# Patient Record
Sex: MALE | Race: WHITE | NOT HISPANIC OR LATINO | Employment: FULL TIME | URBAN - METROPOLITAN AREA
[De-identification: names, ages, dates, MRNs, and addresses within clinical notes are randomized per-mention and may not be internally consistent; named-entity substitution may affect disease eponyms.]

---

## 2018-04-30 ENCOUNTER — APPOINTMENT (OUTPATIENT)
Dept: OCCUPATIONAL MEDICINE | Facility: CLINIC | Age: 48
End: 2018-04-30

## 2018-04-30 PROCEDURE — 99000 SPECIMEN HANDLING OFFICE-LAB: CPT | Performed by: PHYSICIAN ASSISTANT

## 2024-03-11 ENCOUNTER — TRANSCRIBE ORDERS (OUTPATIENT)
Dept: OTHER | Age: 54
End: 2024-03-11

## 2024-03-11 DIAGNOSIS — M54.14 THORACIC RADICULOPATHY: Primary | ICD-10-CM

## 2024-03-18 NOTE — TELEPHONE ENCOUNTER
**3 lumbar surgeries in [place], [place] at both [facility] and [facility] - will arrive early to appointment to sign ROIs for records**        RECORDS RECEIVED FROM: External   REFERRED BY: Dean Joshi PA-C    REASON FOR VISIT: Thoracic radiculopathy   Date of Appt: 3/20/2024   NOTES (FOR ALL VISITS) STATUS DETAILS   OFFICE NOTE from referring provider Care Everywhere 3/8/2024   OFFICE NOTE from other specialist N/A    DISCHARGE SUMMARY from hospital N/A    DISCHARGE REPORT from the ER Care Everywhere 10/23/2023   SURGERY In process    PHYSICAL THERAPY N/A    INJECTIONS N/A    EMG N/A    IMAGING  (FOR ALL VISITS)     XR (HEAD, NECK, SPINE) N/A    MRI (HEAD, NECK, SPINE) Received Thoracic 3/11/24  Thoracic 3/7/24  Cervical 2/2/24   CT (HEAD, NECK, SPINE) N/A

## 2024-03-20 ENCOUNTER — OFFICE VISIT (OUTPATIENT)
Dept: NEUROSURGERY | Facility: CLINIC | Age: 54
End: 2024-03-20
Payer: COMMERCIAL

## 2024-03-20 ENCOUNTER — PRE VISIT (OUTPATIENT)
Dept: NEUROSURGERY | Facility: CLINIC | Age: 54
End: 2024-03-20

## 2024-03-20 VITALS
HEART RATE: 68 BPM | WEIGHT: 218 LBS | DIASTOLIC BLOOD PRESSURE: 88 MMHG | OXYGEN SATURATION: 97 % | SYSTOLIC BLOOD PRESSURE: 130 MMHG | HEIGHT: 69 IN | BODY MASS INDEX: 32.29 KG/M2

## 2024-03-20 DIAGNOSIS — M54.14 THORACIC RADICULOPATHY: ICD-10-CM

## 2024-03-20 PROCEDURE — 99203 OFFICE O/P NEW LOW 30 MIN: CPT | Performed by: SURGERY

## 2024-03-20 RX ORDER — GABAPENTIN 100 MG/1
100 CAPSULE ORAL AT BEDTIME
COMMUNITY
End: 2024-05-28

## 2024-03-20 ASSESSMENT — PAIN SCALES - GENERAL: PAINLEVEL: SEVERE PAIN (6)

## 2024-03-20 NOTE — LETTER
3/20/2024         RE: Paul Kay  [address] [facility] [address]        Dear Colleague,    Thank you for referring your patient, Paul Kay, to the [facility]. Please see a copy of my visit note below.    The patient is a 53-year-old male.  He complains of mid thoracic spine pain.  It is in the midline.  He feels it would get better if he could stretch it.  It is better when lying on the floor.  The pain has been present for 3 to 4 years.  It was spontaneous in onset.  No history of injury.  He has some left leg pain.  He does not have right leg pain.  He has some weakness in the left leg.  He does not have numbness or tingling in his legs.  He does not have trouble with bladder control or bowel control.  He has had multiple laminectomies at the L4-5 level.  No neck surgery.  On past medical history he is in good health with no heart disease, lung disease, cancer, or diabetes.  On exam he is well-developed and well-nourished.  I do not see or feel any deformity of his mid thoracic spine.  There is no particular tenderness.  He has good range of motion.  His reflexes are not hyperactive.  He does not have clonus.  His left ankle reflex is decreased compared to the right.  His right Babinski toe sign is downgoing.   His left is flat.  He has good strength and sensation in his legs and feet.  No atrophy.  On thoracic MRI he has a lesion posterior to his spinal cord causing flattening of his cord from behind.  He also has intrinsic damage inside the spinal cord itself.  Think he probably needs a decompression from behind with removal of the pathology compressing his posterior spinal cord.  I would like to present him at spine conference to see what people think.  I did show him the pictures.  He understands the dilemma.  Total time 30 minutes, more than 50% spent counseling and/or coordinating care.      Again, thank you for allowing me to participate in the care of your  patient.        Sincerely,        Mal Grant MD

## 2024-03-20 NOTE — PROGRESS NOTES
The patient is a 53-year-old male.  He complains of mid thoracic spine pain.  It is in the midline.  He feels it would get better if he could stretch it.  It is better when lying on the floor.  The pain has been present for 3 to 4 years.  It was spontaneous in onset.  No history of injury.  He has some left leg pain.  He does not have right leg pain.  He has some weakness in the left leg.  He does not have numbness or tingling in his legs.  He does not have trouble with bladder control or bowel control.  He has had multiple laminectomies at the L4-5 level.  No neck surgery.  On past medical history he is in good health with no heart disease, lung disease, cancer, or diabetes.  On exam he is well-developed and well-nourished.  I do not see or feel any deformity of his mid thoracic spine.  There is no particular tenderness.  He has good range of motion.  His reflexes are not hyperactive.  He does not have clonus.  His left ankle reflex is decreased compared to the right.  His right Babinski toe sign is downgoing.   His left is flat.  He has good strength and sensation in his legs and feet.  No atrophy.  On thoracic MRI he has a lesion posterior to his spinal cord causing flattening of his cord from behind.  He also has intrinsic damage inside the spinal cord itself.  Think he probably needs a decompression from behind with removal of the pathology compressing his posterior spinal cord.  I would like to present him at spine conference to see what people think.  I did show him the pictures.  He understands the dilemma.  Total time 30 minutes, more than 50% spent counseling and/or coordinating care.

## 2024-03-20 NOTE — NURSING NOTE
"Paul Kay is a 53 year old male who presents for:  Chief Complaint   Patient presents with    Consult     Middle back pain  Left leg sciatica, hip to mid calf  History of 3 laminectomies lumbar in [place], [place] 1999/2000, 2004, 2007  Doing at home PT exercises   Upcoming Botox injections for neck        Initial Vitals:  /88   Pulse 68   Ht 5' 9\" (1.753 m)   Wt 218 lb (98.9 kg)   SpO2 97%   BMI 32.19 kg/m   Estimated body mass index is 32.19 kg/m  as calculated from the following:    Height as of this encounter: 5' 9\" (1.753 m).    Weight as of this encounter: 218 lb (98.9 kg).. Body surface area is 2.19 meters squared. BP completed using cuff size: regular  Severe Pain (6)        Oswestry Disability Index (CESAR   Rian Farah 1980, All rights reserved. Used with permission)    Section 1 - Pain intensity: The pain is very severe at the moment.  Section 2 - Personal care (washing, dressing, etc.) : I can look after myself normally without causing additional pain.  Section 3 - Lifting: Pain prevents me from lifting heavy weights off the floor but I can manage if they are conveniently positioned, e.g. on a table.  Section 4 - Walking: Pain prevents me from walking more than a quarter of a mile.  Section 5 - Sitting: Pain prevents me from sitting for more than 1 hour.  Section 6 - Standing: Pain prevents me from standing for more than 1 hour.  Section 7 - Sleeping: Because of pain I have less than 4 hours sleep.  Section 8 - Sex life (if applicable): My sex life is severely restricted by pain.  Section 9 - Social life: Pain has no significant effect on my social life apart from limiting my more energetic interests, e.g. sport, etc.  Section 10 - Traveling: Pain is bad but I am able to manage trips over two hours.  Sum: 22  Count: 10  Oswestry Score (%): 44 %         Marco Vasquez  "

## 2024-03-27 ENCOUNTER — TELEPHONE (OUTPATIENT)
Dept: NEUROSURGERY | Facility: CLINIC | Age: 54
End: 2024-03-27
Payer: COMMERCIAL

## 2024-03-27 NOTE — TELEPHONE ENCOUNTER
[facility] Call Center    Phone Message    May a detailed message be left on voicemail: yes     Reason for Call: Other: Patient calling stating Dr. Grant was going to present his case at a conference.  He was told to call back and see what the next step is.  Please call him back to advise.      Action Taken: Message routed to:  Other: [facility] Neurosurgery    Travel Screening: Not Applicable

## 2024-03-27 NOTE — TELEPHONE ENCOUNTER
Called Paul with POC after spine board discussion on 03/26/2024. He said that Dr. Grant already called and him and discussed the next step of his treatment plan.   Pat Alas, RN, CNRN

## 2024-03-27 NOTE — PROGRESS NOTES
The patient was presented at brain tumor conference.  The recommendation was myelogram CT next and then decide about surgical approach.

## 2024-04-05 ENCOUNTER — TELEPHONE (OUTPATIENT)
Dept: NEUROSURGERY | Facility: CLINIC | Age: 54
End: 2024-04-05
Payer: COMMERCIAL

## 2024-04-05 DIAGNOSIS — M54.14 THORACIC RADICULOPATHY: Primary | ICD-10-CM

## 2024-04-05 NOTE — TELEPHONE ENCOUNTER
[facility] Call Center    Phone Message    May a detailed message be left on voicemail: yes     Reason for Call: Pt is requesting a call back to discuss options for treating pain.  He wonders if a cortisone injection might help?  Please call him back to discuss.  Thanks.

## 2024-04-05 NOTE — TELEPHONE ENCOUNTER
Called Paul who inquired about referral to Spine clinic for conservative measures.  Pat Alas, RN, CNRN

## 2024-04-07 ENCOUNTER — HEALTH MAINTENANCE LETTER (OUTPATIENT)
Age: 54
End: 2024-04-07

## 2024-04-22 ENCOUNTER — OFFICE VISIT (OUTPATIENT)
Dept: PALLIATIVE MEDICINE | Facility: CLINIC | Age: 54
End: 2024-04-22
Attending: SURGERY
Payer: COMMERCIAL

## 2024-04-22 VITALS — SYSTOLIC BLOOD PRESSURE: 127 MMHG | HEART RATE: 73 BPM | DIASTOLIC BLOOD PRESSURE: 82 MMHG

## 2024-04-22 DIAGNOSIS — M54.9 INTRACTABLE BACK PAIN: ICD-10-CM

## 2024-04-22 DIAGNOSIS — M54.14 THORACIC RADICULOPATHY: Primary | ICD-10-CM

## 2024-04-22 DIAGNOSIS — M25.78 DISC-OSTEOPHYTE COMPLEX: ICD-10-CM

## 2024-04-22 DIAGNOSIS — M50.30 DISC-OSTEOPHYTE COMPLEX: ICD-10-CM

## 2024-04-22 DIAGNOSIS — M51.14 INTERVERTEBRAL DISC DISORDER WITH RADICULOPATHY OF THORACIC REGION: ICD-10-CM

## 2024-04-22 PROCEDURE — 99205 OFFICE O/P NEW HI 60 MIN: CPT | Performed by: NURSE PRACTITIONER

## 2024-04-22 PROCEDURE — G2211 COMPLEX E/M VISIT ADD ON: HCPCS | Performed by: NURSE PRACTITIONER

## 2024-04-22 RX ORDER — GABAPENTIN 100 MG/1
CAPSULE ORAL
Qty: 270 CAPSULE | Refills: 0 | Status: SHIPPED | OUTPATIENT
Start: 2024-04-22

## 2024-04-22 RX ORDER — METHYLPREDNISOLONE 4 MG
TABLET, DOSE PACK ORAL
Qty: 21 TABLET | Refills: 0 | Status: SHIPPED | OUTPATIENT
Start: 2024-04-22 | End: 2024-05-13

## 2024-04-22 ASSESSMENT — PAIN SCALES - GENERAL: PAINLEVEL: SEVERE PAIN (6)

## 2024-04-22 NOTE — PROGRESS NOTES
"[facility] Medical Spine Evaluation      Date of Visit: 4/22/2024    Patient seen at the request of Mal Grant for an opinion and evaluation of thoracic radiculopathy.      Subjective    HISTORY OF PRESENT ILLNESS:  Paul Kay is a 53 year old male who presents with a chief complaint of severe radicular thoracic pain.       Back Pain  Onset: Pain has been persistent for several months with increased intensity and is not associated with trauma. The pain is localized to the mid-thoracic region bilaterally; he denies numbness/tingling; described as sharp in nature. Pain is reported as 5-6/10 at rest today and up to 8/10 at worst in the last week.    Description:   Location of pain: middle of back bilateral; also has muscle spasms along anterior lower ribcage   Radiation: None   Character of pain: Sharp and \"like I'm being stabbed in the back\"    Pain free between episodes: No, constant but varying degrees    Any injury? ( trauma, lifting, bending, twisting?): No   Work Injury (Work Comp?): No  Intensity: severe     History:  Able to sleep?:  Pain interrupts sleep     Able to work?: Yes, returned to work one week ago and pain has been \"terrible\"           History of back problems before: recurrent self limited episodes of low back pain in the past, previous degenerative joint disease of the lumbar spine, previous herniated disc, and previous spinal surgery - 3 separate laminectomies of L4-L5; most recent was 2002 in [place], [place]          Pain-free between episodes: No   Any previous evaluations for back pain: Chiropractor, Spine Specialist, MRI, and Physical Therapy    Any previous spine MRI or X-rays: Yes, Thoracic MRI     Any surgery: Yes, L4-:L5     Any cancer history: No   Accompanying Signs & Symptoms:   Fever: No   Numbness or tingling in arms, legs, feet: Yes, occasional left leg tingling     Weakness in arms, legs, feet: No   Dysuria: No    Blood in urine: No   Urinary incontinence: No "   Bowel incontinence: No   Weight loss: No   Precipitating and/or Alleviating factors:    Does rest help: Yes, lays flat on floor     Certain positions make it better or worse: Yes, unable to define what makes worse    Does bending over, or twisting make it worse: Yes   Progression of Symptoms since onset: worse  Therapies tried and outcome: physical therapy, rest, stretching, cold therapy, heat therapy, and NSAIDS with intermittent relief              Current Pain Medications:   - Ibuprofen 200mg - reports taking 20 tablets (4000mg) daily   - Gabapentin 100mg - was prescribed 200mg at bedtime     Prior/Trialed Pain Medications:    - Cyclobenzaprine (NH)  - Methocarbamol (NH)     Prior Related Surgery: multiple L4-L5 laminectomies; records not available       Specialists Seen - (with most recent, available notes and clinic visits reviewed)   1. Neurosurgery - Dr. Mal Grant        IMAGING - reviewed     MR Thoracic Spine w.wo contrast  3/11/24  Findings:   The thoracic vertebral body heights are grossly maintained.   There is grossly preserved normal thoracic kyphosis. There is no significant spondylolisthesis.   There is grossly preserved marrow signal intensity on T1 and T2 sequences. There is no evidence of significant STIR hyperintensity to suggest edema.   There is again seen focal T2/STIR hyperintense lesion within the mid spinal cord centered at the T6-T7 level extending inferiorly to the T7-T8 level. There is moderate ventral effacement of the spinal cord at the T6-T7 and T7 levels. There is no evidence of abnormal contrast enhancement within the previously seen intramedullary lesion.     Extensive degenerative disc height loss and disc protrusions are appreciated at the T4-5, T5-6, T6-T7 and T7-T8 levels.   The spinal canal is otherwise patent.   There is no significant neuroforaminal narrowing.   The paraspinous tissues are grossly unremarkable.   There is no abnormal contrast enhancement.      Impression:   Overall, stable appearance of previously seen intramedullary lesion within the spinal cord centered from the T6-T7 through T7-T8 levels without evidence of enhancement. Overall, findings may represent evolving edema secondary to ventral spinal cord herniation versus effacement of the posterior spinal cord due to an arachnoid cyst, less likely intramedullary neoplasm.       MR Thoracic Spine w.o contrast  3/07/24  Findings:   Preserved thoracic kyphosis. No significant spondylolisthesis. No acute osseous abnormality. Unremarkable bone marrow signal.     Multilevel degenerative disc changes, with prominent central disc protrusions and/or posterior disc bulges greatest at the midthoracic levels.     There is ill-defined short segment intramedullary T2 hyperintense fullness of the central T7 cord, with contiguous extension cranially within the right hemicord slightly above the T6-7 disc space. At the T7-8 level, posterior disc-osteophyte complex effaces the ventral thecal sac, and flattens the ventral cord surface. The dorsal cord surface also appears slightly flattened from the mid T7 level to the T7-8 disc space.     Remainder of the visualized spinal cord appears normal in course, caliber, and intrinsic signal. No high-grade neural foraminal or spinal canal stenosis. No suspicious findings identified in the paraspinal soft tissues.     Impression:   1. T2 hyperintense intramedullary signal abnormality and mild fullness of the T7 cord, with ventral thecal sac effacement from posterior disc-osteophyte complex at T7-8, and apparent dorsal cord surface flattening at the inferior T7 level.   2. These changes appear greater than would be expected for the degree of spondylosis.   3. Advise obtaining postcontrast MR images through this level to evaluate for underlying cord lesion, followed by thoracic myelogram to evaluate for a dorsal arachnoid web/cyst versus less likely ventral cord herniation.          REVIEW OF SYSTEMS:   I am responding to those symptoms which are directly relevant to the specific indication for my consultation. I recommend that the patient follow up with their primary or referring provider to pursue any other symptoms which may be of concern.       Medical History:  He  has no past medical history on file.     He  has no past surgical history on file.    Family History  His family history is not on file.     Social History:  Work: seasonal    History of any legal related pain issues: none          Current Medications:   He has a current medication list which includes the following prescription(s): gabapentin.     Allergies:   Allergies   Allergen Reactions    Codeine Nausea         Objective   OBJECTIVE    Vitals:    04/22/24 1416   BP: 127/82   Pulse: 73         Appearance:   A&O. Patient is appropriate. Patient is in NAD.      HHENT:  Normocephalic, atraumatic. Eyes without conjunctival injection or jaundice. Neck supple.     Pulmonary:  Normal effort; no cough or audible wheeze.       Skin: No obvious rash, lesions, or petechiae of exposed skin.    Psych:  Alert, without lethargy or stupor. Speech fluent. Appropriate affect. Mood normal. Able to follow commands without difficulty. Normal mood, judgement and behavior     Spine Musculoskeletal Exam    Inspection    Kyphosis: thoracic kyphosis    Thoracolumbar        Prior incision: none    Palpation    Thoracolumbar    Tenderness: present (thoracic paraspinal and midscapular)    Right      Spasms: mild    Left      Spasms: mild    Range of Motion    Thoracolumbar       Flexion: 50%. Flexion detail: pain.     Extension: 50%. Extension detail: pain.       Right      Lateral rotation: 50%. Lateral rotation detail: pain.       Left      Lateral rotation: 50%. Lateral rotation detail: pain.      Strength    Cervical Spine    Cervical spine motor exam is normal.    Sensory    Cervical Spine    Cervical spine sensation is  normal.    Reflexes    Right        Biceps: normal    Left        Biceps: normal             Assessment & Plan    ASSESSMENT:    Paul Kay is a pleasant 53 year old male who presents with intractable thoracic radicular pain.  Patient works a seasonal construction job and states he is trying to avoid thoracic surgery until the end of the season.  He is taking interventional options to allow him to continue to for the next 6 to 7 months.  He is not currently established with a primary care provider within the [facility] system; I encouraged him to establish care.  He would be a candidate for the chronic pain program if he has a [place] primary care provider.  He is overusing ibuprofen on a daily basis; advised him this is more likely the cause of his anterior lower rib cage pain.  Patient educated on risks of GI bleeding and also development with overuse of NSAIDs.  He returned to work 1 week ago and his pain levels are severe.  Ordering Medrol Dosepak for acute inflammation.  Stop ibuprofen.  Okay to use acetaminophen 500 mg every 4 hours.  Order placed for TESI for interventional pain control.  Starting over gabapentin taper by 100 mg every 3 days to achieve 300 mg 3 times a day.  Advised patient we will go slow with this taper to avoid side effects.Return to clinic in 3 to 4 weeks for follow-up.  Patient verbalizes understanding and agreement with plan.      PLAN:      Medication Management:   - START taking Medrol Dose Pack - follow package instructions. Do not use Ibuprofen while taking the steroid.    - TAPER Gabapentin as follows:  Take 100mg by mouth at bedtime for 3 days, then Take 100mg by mouth in AM and 100mg at bedtime for 3 days, then Take 100mg by mouth three times per day. Okay to keep adding 100mg by mouth every 3 days to achieve 300mg three times per day. If you develop side effects, drop back to the previous dose.   - Take Acetaminophen 500mg - one tablet every 4 hours for pain; do not  exceed 4000mg in 24 hours  - If you take Ibuprofen, do not exceed 2400mg per day.      New Orders:   -  T6-T7 and T7-T8 Epidural Steroid Injection      Return to Clinic:   -  30-minute Video or In-Person Appointment with Eva Fong DNP, VALENTIN, MIL in 3-4 weeks, or sooner if needed        Ready to learn, no apparent learning barriers.  Education provided on treatment plan according to patient's preferred learning style.  Patient verbalizes understanding.       VISIT DIAGNOSIS:   (M54.14) Thoracic radiculopathy  (primary encounter diagnosis)  Plan: methylPREDNISolone (MEDROL DOSEPAK) 4 MG tablet        therapy pack, gabapentin (NEURONTIN) 100 MG         capsule, Adult Pain Clinic Follow-Up Order,         PAIN INJECTION EVAL/TREAT/FOLLOW UP          (M50.30,  M25.78) Disc-osteophyte complex  Plan: methylPREDNISolone (MEDROL DOSEPAK) 4 MG tablet        therapy pack, gabapentin (NEURONTIN) 100 MG         capsule, Adult Pain Clinic Follow-Up Order,         PAIN INJECTION EVAL/TREAT/FOLLOW UP          (M54.9) Intractable back pain  Plan: methylPREDNISolone (MEDROL DOSEPAK) 4 MG tablet        therapy pack, gabapentin (NEURONTIN) 100 MG         capsule, Adult Pain Clinic Follow-Up Order          (M51.14) Intervertebral disc disorder with radiculopathy of thoracic region  Plan: PAIN INJECTION EVAL/TREAT/FOLLOW UP              ___________________________________________________________________    BILLING TIME DOCUMENTATION:   TOTAL TIME includes:   Time spent preparing to see the patient: 10 minutes (reviewing records and tests)  Time spend face to face with the patient: 45 minutes  Time spent ordering tests, medications, procedures and referrals: 0 minutes  Time spent Referring and communicating with other healthcare professionals: 0 minutes  Documenting clinical information in Epic: 5 minutes    The total TIME spent on this patient on the day of the appointment was 60 minutes.    ___________________________________________________________________    Review of Electronic Chart, Relevant Records/History: Today I have also reviewed available medical information in the patient's medical record at [facility] and Care Everywhere (if available), including relevant provider notes, laboratory work, and imaging. Please see the [facility] health questionnaire which the patient completed and reviewed with me in detail.     Eva Fong, APRN, DNP, FNP-C   [facility]

## 2024-04-22 NOTE — PATIENT INSTRUCTIONS
PATIENT INSTRUCTIONS:     I am recommending a multidisciplinary treatment plan to help this patient better manage his pain. The following recommendations were given to the patient. Diagnosis, treatment options, risks, benefits, and alternatives were discussed; all questions were answered. Self-care instructions were given. The patient expressed understanding of the plan for management.   Medication Management:   - START taking Medrol Dose Pack - follow package instructions. Do not use Ibuprofen while taking the steroid.    - TAPER Gabapentin as follows:  Take 100mg by mouth at bedtime for 3 days, then Take 100mg by mouth in AM and 100mg at bedtime for 3 days, then Take 100mg by mouth three times per day. Okay to keep adding 100mg by mouth every 3 days to achieve 300mg three times per day. If you develop side effects, drop back to the previous dose.   - Take Acetaminophen 500mg - one tablet every 4 hours for pain; do not exceed 4000mg in 24 hours  - If you take Ibuprofen, do not exceed 2400mg per day.   New Orders:   -  T6-T7 and T7-T8 Epidural Steroid Injection    Return to Clinic:   -  30-minute Video or In-Person Appointment with Eva Fong, TYLER, APRN, ERMAC in 3-4 weeks, or sooner if needed    ----------------------------------------------------------------  Clinic Number:  [phone]   Call with any questions about your care and for scheduling assistance.   Calls are returned Monday through Friday between 8 AM and 4:30 PM. We usually get back to you within 2 business days depending on the issue/request.    If we are prescribing your medications:  For opioid medication refills, call the clinic or send a Memeo message 7 days in advance.  Please include:  Name of requested medication  Name of the pharmacy.  For non-opioid medications, call your pharmacy directly to request a refill. Please allow 3-4 days to be processed.   Per [place] State Law:  All controlled substance prescriptions must be filled within 30  days of being written.    For those controlled substances allowing refills, pickup must occur within 30 days of last fill.      We believe regular attendance is key to your success in our program!    Any time you are unable to keep your appointment we ask that you call us at least 24 hours in advance to cancel.This will allow us to offer the appointment time to another patient.   Multiple missed appointments may lead to dismissal from the clinic.

## 2024-04-23 ENCOUNTER — TELEPHONE (OUTPATIENT)
Dept: PALLIATIVE MEDICINE | Facility: CLINIC | Age: 54
End: 2024-04-23
Payer: COMMERCIAL

## 2024-04-23 DIAGNOSIS — M54.14 THORACIC RADICULOPATHY: Primary | ICD-10-CM

## 2024-04-23 NOTE — TELEPHONE ENCOUNTER
"Patient is taking a steroid, he will call back to let us know the end date to schedule.     Screening Questions for Radiology Injections:    Injection to be done at which interventional clinic site? [facility]    If choosing [facility] for location, please inform patient:  \"[facility] is a Hospital based clinic. Before your visit, you should check with your insurance about how it covers the charges for facility services in a hospital-based clinic.     Procedure ordered by     Procedure ordered? T6-7 and T7-8 Epidural Steroid Injection   Transforaminal Cervical ANASTACIA - Send to [facility] ([facility]) - No [facility] Site providers perform this procedure    What insurance would patient like us to bill for this procedure? [facility]   IF SCHEDULING IN [place] [facility] OR SPINE PLEASE SCHEDULE AT LEAST 7-10 BUSINESS DAYS OUT SO A PA CAN BE OBTAINED  Worker's comp or MVA (motor vehicle accident) -Any injection DO NOT SCHEDULE and route to Tana Sosa.    [id number] insurance - For SI joint injections, DO NOT SCHEDULE and route to Francia Corcoran.     ALL BCBS, [employer] and [employer] - DO NOT SCHEDULE and route to Francia Corcoran  MEDICA- ALL INJECTIONS- route to Francia Corcoran    Is patient scheduled at [facility]?    If YES, route every encounter to [facility] SPINE CENTER CARE NAVIGATION POOL [[phone]]    Is an  needed? No     Patient has a  home? (Review Grid) YES: informed     Any chance of pregnancy? Not Applicable   If YES, do NOT schedule and route to RN pool  - Dr. Dey route to Cely Canales and PM&R Nurse  [[id number]]      Is patient actively being treated for cancer or immunocompromised? No  If YES, do NOT schedule and route to RN pool/ Dr. Dey's Team    Does the patient have a bleeding or clotting disorder? No   If YES, okay to schedule AND route to RN  / Dr. Dey's Team   (For any patients with platelet count <100, RN must forward to provider)    Is patient taking any Blood " Thinners OR Antiplatelet medication?  No   If hold needed, do NOT schedule, route to RN pool/ Dr. Dey's Team  Examples:   Blood Thinners: (Coumadin, Warfarin, Jantoven, Pradaxa, Xarelto, Eliquis, Edoxaban, Enoxaparin, Lovenox, Heparin, Arixtra, Fondaparinux or Fragmin)  Antiplatelet Medications: (Plavix, Brilinta or Effient)     Is patient taking any aspirin products (includes Excedrin and Fiorinal)? No   If yes route to RN pool/ Dr. Dey's Team - Do not schedule      Any allergies to contrast dye, iodine, shellfish, or numbing and steroid medications? No  If YES, schedule and add allergy information to appointment notes AND route to the RN pool/ Dr. Dey's Team  If ANASTACIA and Contrast Dye / Iodine Allergy? DO NOT SCHEDULE, route to RN pool/ Dr. Thomass Team  Allergies: Codeine     Does patient have an active infection or treated for one within the past week? No  Is patient currently taking any antibiotics or steroid medications?  Yes - will    For patients on chronic, preventative, or prophylactic antibiotics, procedures may be scheduled.   For patients on antibiotics for active or recent infection, schedule 4 days after completed.  For patients on steroid medications, schedule 4 days after completed.     Has the patient had a flu shot or any other vaccinations within the past 7 days? No  If yes, explain that for the vaccine to work best they need to:     wait 1 week before and 1 week after getting any Vaccine  wait 1 week before and 2 weeks after getting any Covid Vaccine   If patient has concerns about the timing, send to RN pool/ Dr. Thomass Team    Does patient have an MRI/CT?  YES: 2024 Include Date and Check Procedure Scheduling Grid to see if required.  Was the MRI/CT done within the last 3 years?  Yes   If no route to RN Pool/ Dr. Thomass Team  If yes, where was the MRI/CT done? [facility]    Refer to PACS Transmissions list for approved external locations and route to RN Pool High Priority/ Dr. Thomass  Team  If MRI was not done at approved external location do NOT schedule and route to RN pool/ Dr. Dey's Team    If patient has an imaging disc, the injection MAY be scheduled but patient must bring disc to appt or appt will be cancelled.    Is patient able to transfer to a procedure table with minimal or no assistance? Yes   If no, do NOT schedule and route to RN Pool/ Dr. Dey's Team    Procedure Specific Instructions:  If celiac plexus block, informed patient NPO for 6 hours and that it is okay to take medications with sips of water, especially blood pressure medications Not Applicable       If this is for a cervical procedure, informed patient that aspirin needs to be held for 6 days.   Not Applicable    Sedation, If Sedation is ordered for any procedure, patient must be NPO for 6 hours prior to procedure Not Applicable    If IV needed:  Do not schedule procedures requiring IV placement in the first appointment of the day or first appointment after lunch. Do NOT schedule at 0745, 0815 or 1245.   Instructed patient to arrive 30 minutes early for IV start if required. (Check Procedure Scheduling Grid)  Not Applicable    Reminders:  If you are started on any steroids or antibiotics between now and your appointment, you must contact us because the procedure may need to be cancelled.  Yes    As a reminder, receiving steroids can decrease your body's ability to fight infection.   Would you still like to move forward with scheduling the injection?  Yes    IV Sedation is not provided for procedures. If oral anti-anxiety medication is needed, the patient should request this from their referring provider.    Instruct patient to arrive as directed prior to the scheduled appointment time:  If IV needed 30 minutes before appointment time     For patients 85 or older we recommend having an adult stay w/ them for the remainder of the day.     If the patient is Diabetic, remind them to bring their glucometer.      Does the  patient have any questions?  NO  Roseline Ayala  [facility] [place]

## 2024-05-13 ENCOUNTER — OFFICE VISIT (OUTPATIENT)
Dept: PALLIATIVE MEDICINE | Facility: CLINIC | Age: 54
End: 2024-05-13
Payer: COMMERCIAL

## 2024-05-13 VITALS — HEART RATE: 69 BPM | SYSTOLIC BLOOD PRESSURE: 124 MMHG | DIASTOLIC BLOOD PRESSURE: 83 MMHG

## 2024-05-13 DIAGNOSIS — M51.14 INTERVERTEBRAL DISC DISORDER WITH RADICULOPATHY OF THORACIC REGION: ICD-10-CM

## 2024-05-13 DIAGNOSIS — M54.14 THORACIC RADICULOPATHY: ICD-10-CM

## 2024-05-13 DIAGNOSIS — M25.78 DISC-OSTEOPHYTE COMPLEX: ICD-10-CM

## 2024-05-13 DIAGNOSIS — M54.9 INTRACTABLE BACK PAIN: Primary | ICD-10-CM

## 2024-05-13 DIAGNOSIS — M50.30 DISC-OSTEOPHYTE COMPLEX: ICD-10-CM

## 2024-05-13 DIAGNOSIS — Z51.81 ENCOUNTER FOR THERAPEUTIC DRUG MONITORING: ICD-10-CM

## 2024-05-13 LAB
CANNABINOIDS UR QL SCN: ABNORMAL
CREAT UR-MCNC: 147 MG/DL

## 2024-05-13 PROCEDURE — G0481 DRUG TEST DEF 8-14 CLASSES: HCPCS | Performed by: NURSE PRACTITIONER

## 2024-05-13 PROCEDURE — G2211 COMPLEX E/M VISIT ADD ON: HCPCS | Performed by: NURSE PRACTITIONER

## 2024-05-13 PROCEDURE — 99214 OFFICE O/P EST MOD 30 MIN: CPT | Performed by: NURSE PRACTITIONER

## 2024-05-13 PROCEDURE — 80307 DRUG TEST PRSMV CHEM ANLYZR: CPT | Performed by: NURSE PRACTITIONER

## 2024-05-13 RX ORDER — TRAMADOL HYDROCHLORIDE 50 MG/1
50 TABLET ORAL EVERY 6 HOURS PRN
Qty: 42 TABLET | Refills: 0 | Status: SHIPPED | OUTPATIENT
Start: 2024-05-13 | End: 2024-06-20

## 2024-05-13 RX ORDER — CYCLOBENZAPRINE HCL 5 MG
5-10 TABLET ORAL EVERY 8 HOURS PRN
Qty: 30 TABLET | Refills: 0 | Status: SHIPPED | OUTPATIENT
Start: 2024-05-13

## 2024-05-13 ASSESSMENT — PAIN SCALES - GENERAL: PAINLEVEL: SEVERE PAIN (6)

## 2024-05-13 NOTE — PATIENT INSTRUCTIONS
----------------------------------------------------------------  [facility] Number:  [phone]   Call with any questions about your care and for scheduling assistance.   Calls are returned Monday through Friday between 8 AM and 4:30 PM. We usually get back to you within 2 business days depending on the issue/request.    If we are prescribing your medications:  For opioid medication refills, call the clinic or send a NextNine message 7 days in advance.  Please include:  Name of requested medication  Name of the pharmacy.  For non-opioid medications, call your pharmacy directly to request a refill. Please allow 3-4 days to be processed.   Per [place] State Law:  All controlled substance prescriptions must be filled within 30 days of being written.    For those controlled substances allowing refills, pickup must occur within 30 days of last fill.      We believe regular attendance is key to your success in our program!    Any time you are unable to keep your appointment we ask that you call us at least 24 hours in advance to cancel.This will allow us to offer the appointment time to another patient.   Multiple missed appointments may lead to dismissal from the clinic.

## 2024-05-13 NOTE — PROGRESS NOTES
[facility] Follow-Up Encounter      Date of Visit: 5/13/2024    Recall: Paul Kay is a 53 year old male presents with thoracic radiculopathy.     Patient was last seen in clinic 4/22/24. Since last time he felt much better after using Medrol dose pack; but severe pain returned after 2-3 days. Physical activity over the past weekend was higher than normal. He has tolerated Gabapentin taper up to 300mg TID.  States pain is so severe that he is nearly immobile by the time he is on for work and this is impacting his relationships at home.  He is questioning the cause of the spinal cord lesion and does not understand the full impact of his thoracic MRI results.  He is trying to avoid surgery as long as possible.  Struggling to get to physical therapy appointments due to work schedule; does have some exercises he has been working on at home.       Interval assessment and plan from 4/22/24:   Paul Kay is a pleasant 53 year old male who presents with intractable thoracic radicular pain.  Patient works a seasonal construction job and states he is trying to avoid thoracic surgery until the end of the season.  He is taking interventional options to allow him to continue to for the next 6 to 7 months.  He is not currently established with a primary care provider within the [facility] system; I encouraged him to establish care.  He would be a candidate for the chronic pain program if he has a [place] primary care provider.  He is overusing ibuprofen on a daily basis; advised him this is more likely the cause of his anterior lower rib cage pain.  Patient educated on risks of GI bleeding and also development with overuse of NSAIDs.  He returned to work 1 week ago and his pain levels are severe.  Ordering Medrol Dosepak for acute inflammation.  Stop ibuprofen.  Okay to use acetaminophen 500 mg every 4 hours.  Order placed for TESI for interventional pain control.  Starting over gabapentin taper  by 100 mg every 3 days to achieve 300 mg 3 times a day.  Advised patient we will go slow with this taper to avoid side effects.Return to clinic in 3 to 4 weeks for follow-up.  Patient verbalizes understanding and agreement with plan.  PLAN:    Medication Management:   - START taking Medrol Dose Pack - follow package instructions. Do not use Ibuprofen while taking the steroid.    - TAPER Gabapentin as follows:  Take 100mg by mouth at bedtime for 3 days, then Take 100mg by mouth in AM and 100mg at bedtime for 3 days, then Take 100mg by mouth three times per day. Okay to keep adding 100mg by mouth every 3 days to achieve 300mg three times per day. If you develop side effects, drop back to the previous dose.   - Take Acetaminophen 500mg - one tablet every 4 hours for pain; do not exceed 4000mg in 24 hours  - If you take Ibuprofen, do not exceed 2400mg per day.   New Orders:   -  T6-T7 and T7-T8 Epidural Steroid Injection    Return to Clinic:   -  30-minute Video or In-Person Appointment with Eva Fong, TYLER, VALENTIN, MIL in 3-4 weeks, or sooner if needed           Review of Systems:   GENERAL: no recent illness, unexplained weight loss or weight gain  NEUROLOGIC: no new numbness, tingling, or weakness except as mentioned in HPI   : Continent bladder  GI: Continent bowel      Imaging: Interim updated imaging report(s) reviewed.     he  has no past medical history on file.     he  has no past surgical history on file.      he has a current medication list which includes the following prescription(s): gabapentin, gabapentin, and methylprednisolone.     his family history is not on file.     he       OBJECTIVE    Vitals:    05/13/24 1324   BP: 124/83   Pulse: 69         Appearance:   A&O. Patient is appropriate. Patient appears uncomfortable.      HHENT:  Normocephalic, atraumatic. Eyes without conjunctival injection or jaundice. Neck supple.     Pulmonary:  Normal effort; no cough or audible wheeze.       Skin: No  obvious rash, lesions, or petechiae of exposed skin.    Psych:  Alert, without lethargy or stupor. Speech fluent.  Tearful affect. Mood normal. Able to follow commands without difficulty. Normal mood, judgement and behavior         Assessment & Plan    ASSESSMENT:    Paul Kay is a pleasant 53 year old male who presents with thoracic radiculopathy and intractable back pain.  He is scheduled for thoracic ANASTACIA next week but states pain has been excruciating.  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  In the meantime he states he is desperate for any pain relief.  I offered a 14-day trial of tramadol for severe breakthrough pain.  Also adding cyclobenzaprine 5 to 10 mg at bedtime to ease pain and promote sleep.  Increase gabapentin to 400 mg 3 times daily.  Reinforced that epidural steroid injection next week should be very beneficial.  In the meantime, I am willing to bring the patient into the chronic pain program to address longer term pain needs.  Ordering urine drug screen today.  Return to clinic in 2 weeks to reassess pain level, response to thoracic ANASTACIA and establish controlled substance agreement for ongoing management.  I discussed the benefits of buprenorphine for chronic pain management and will consider a trial after prior authorization.  Patient verbalizes understanding agreement with plan.    PLAN:      Medication Management:   - START taking Cyclobenzaprine 5mg - take 1-2 tabs (5-10mg) every 6-8 hours as needed; do not exceed 3 per day    - START taking Tramadol 50mg - take 1-1.5 tabs every 6-8 hours for severe pain; do not exceed 3 per day   - INCREASE dose of Gabapentin to 400mg by mouth three times per day      Continue Current Treatment Plan with:   - Physical Therapy    - Thoracic epidural steroid injection next week.      New Referrals:   - Chiropractor:    I highly recommend Chiropractic care for conservative treatment your  condition. Please contact your health insurance customer service line to find a local Chiropractor that participates in your health plan.      New Orders:   - Urine drug screen to confirm status of control substance use.       Return to Clinic:   -  30-minute In-Person Appointment with Eva Fong DNP, VALENTIN, MIL in 2-3 weeks, or sooner if needed         Ready to learn, no apparent learning barriers.  Education provided on treatment plan according to patient's preferred learning style.  Patient verbalizes understanding.       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  Plan: Adult Pain Clinic Follow-Up Order          (Z51.81) Encounter for therapeutic drug monitoring  Plan: Drug Confirmation Panel Urine with Creatinine,         Adult Pain Clinic Follow-Up Order              ___________________________________________________________________    BILLING TIME DOCUMENTATION:   TOTAL TIME includes:   Time spent preparing to see the patient: 3 minutes (reviewing records and tests)  Time spend face to face with the patient: 30 minutes  Time spent ordering tests, medications, procedures and referrals: 0 minutes  Time spent Referring and communicating with other healthcare professionals: 0 minutes  Documenting clinical information in Epic: 5 minutes    The total TIME spent on this patient on the day of the appointment was 38 minutes.    ___________________________________________________________________    Review of Electronic Chart, Relevant Records/History: Today I have also reviewed available medical information in the patient's medical record at [facility] and Care Everywhere (if available), including relevant provider notes, laboratory work, and imaging. Please see the [facility] health questionnaire which the patient completed and reviewed with me in detail.     Eva Fong, APRN, DNP, FNP-C   [facility]

## 2024-05-13 NOTE — PROGRESS NOTES
05/13/24 1326   PEG: A Thee-Item Scale Assessing Pain Intensity and Interference        0 = No pain / No interference    10 = Pain as bad as you can imagine / Completely interferes   What number best describes your pain on average in the past week? 5   What number best describes how, during the past week, pain has interfered with your enjoyment of life? 6   What number best describes how, during the past week, pain has interfered with your general activity? 7   PEG Total Score 6

## 2024-05-15 LAB — GABAPENTIN UR QL CFM: PRESENT

## 2024-05-22 ENCOUNTER — TELEPHONE (OUTPATIENT)
Dept: PALLIATIVE MEDICINE | Facility: OTHER | Age: 54
End: 2024-05-22
Payer: COMMERCIAL

## 2024-05-22 NOTE — TELEPHONE ENCOUNTER
Preprocedure reminder call Thoracic ANASTACIA    Arrival time 0815am    Location: [facility] [address] [facility]    Do you have a ? yes    If patient doesn't have a  they will need to call and reschedule    Has the patient had a flu shot or any other vaccinations within the past 7 days? no    If yes, explain that for the vaccine to work best they need to:              wait 1 week before and 1 week after getting any Vaccine           wait 1 week before and 2 weeks after getting any Covid Vaccine    If patient has concerns about the timing, send to RN pool    Have you had any oral steroids or antibiotics in the last five days? no (Oral steroid ok per Alfaro but note the patient is taking it or not)    If yes check with the nurse or provider. The procedure will most likely need to be rescheduled.    Its ok to eat and drink as usual and take your  BP and diabetes medications if this applies to you. ok (note patient has been informed yes or no)      To call and reschedule or talk to the nurse about any questions you may have please dial    [phone]

## 2024-05-23 ENCOUNTER — RADIOLOGY INJECTION OFFICE VISIT (OUTPATIENT)
Dept: PALLIATIVE MEDICINE | Facility: OTHER | Age: 54
End: 2024-05-23
Attending: NURSE PRACTITIONER
Payer: COMMERCIAL

## 2024-05-23 VITALS — DIASTOLIC BLOOD PRESSURE: 78 MMHG | OXYGEN SATURATION: 97 % | SYSTOLIC BLOOD PRESSURE: 129 MMHG | HEART RATE: 62 BPM

## 2024-05-23 DIAGNOSIS — M51.14 INTERVERTEBRAL DISC DISORDER WITH RADICULOPATHY OF THORACIC REGION: ICD-10-CM

## 2024-05-23 DIAGNOSIS — M54.14 THORACIC RADICULOPATHY: Primary | ICD-10-CM

## 2024-05-23 DIAGNOSIS — M50.30 DISC-OSTEOPHYTE COMPLEX: ICD-10-CM

## 2024-05-23 DIAGNOSIS — M25.78 DISC-OSTEOPHYTE COMPLEX: ICD-10-CM

## 2024-05-23 PROCEDURE — 250N000011 HC RX IP 250 OP 636: Performed by: STUDENT IN AN ORGANIZED HEALTH CARE EDUCATION/TRAINING PROGRAM

## 2024-05-23 PROCEDURE — 255N000002 HC RX 255 OP 636: Performed by: STUDENT IN AN ORGANIZED HEALTH CARE EDUCATION/TRAINING PROGRAM

## 2024-05-23 PROCEDURE — 62321 NJX INTERLAMINAR CRV/THRC: CPT | Performed by: STUDENT IN AN ORGANIZED HEALTH CARE EDUCATION/TRAINING PROGRAM

## 2024-05-23 PROCEDURE — 250N000009 HC RX 250: Performed by: STUDENT IN AN ORGANIZED HEALTH CARE EDUCATION/TRAINING PROGRAM

## 2024-05-23 RX ORDER — DEXAMETHASONE SODIUM PHOSPHATE 10 MG/ML
10 INJECTION INTRAMUSCULAR; INTRAVENOUS ONCE
Status: COMPLETED | OUTPATIENT
Start: 2024-05-23 | End: 2024-05-23

## 2024-05-23 RX ORDER — LIDOCAINE HYDROCHLORIDE 10 MG/ML
1 INJECTION, SOLUTION EPIDURAL; INFILTRATION; INTRACAUDAL; PERINEURAL ONCE
Status: COMPLETED | OUTPATIENT
Start: 2024-05-23 | End: 2024-05-23

## 2024-05-23 RX ADMIN — DEXAMETHASONE SODIUM PHOSPHATE 10 MG: 10 INJECTION INTRAMUSCULAR; INTRAVENOUS at 10:00

## 2024-05-23 RX ADMIN — IOHEXOL 1.5 ML: 180 INJECTION INTRAVENOUS at 09:12

## 2024-05-23 RX ADMIN — LIDOCAINE HYDROCHLORIDE 1 ML: 10 INJECTION, SOLUTION EPIDURAL; INFILTRATION; INTRACAUDAL; PERINEURAL at 10:00

## 2024-05-23 ASSESSMENT — PAIN SCALES - GENERAL: PAINLEVEL: MILD PAIN (2)

## 2024-05-23 NOTE — PROGRESS NOTES
Pre procedure Diagnosis: thoracic radiculopathy   Post procedure Diagnosis: Same  Procedure performed: T6-7 interlaminar epidural steroid injection, CPT code 37496  Anesthesia: none  Complications: none  Operators: Piotr Alfaro MD     Indications:   Paul Kay is a 53 year old male was sent by Eva Fong NP for a thoracic epidural steroid injection. During the consent process, this procedure was determined to be an appropriate intervention for the patient's symptoms.    Options/alternatives, benefits and risks were discussed with the patient including bleeding, infection, no pain relief, tissue trauma, exposure to radiation, reaction to medications, spinal cord injury, dural puncture, weakness, numbness and headache.  Questions were answered to his satisfaction and he agrees to proceed. Voluntary informed consent was obtained and signed.     Vitals were reviewed: Yes  Allergies were reviewed:  Yes   Medications were reviewed:  Yes   Pre-procedure pain score: see flowsheet/10    Procedure:  After getting informed consent, patient was brought into the procedure suite and was placed in a prone position on the procedure table.   A Pause for the Cause was performed.  Patient was prepped and draped in sterile fashion.     The T6-7 interspace was identified with AP fluoroscopy.  A total of 2ml of 1% lidocaine was used to anesthetize the skin for a left paramedian approach.    A 22gauge 3.5inch Touhy needle was advanced out of plane in a [id number] direction under intermittent fluoroscopy.  A DARIANA syringe was used to advance the needle into the epidural space.   After loss of resistance, there was no evidence of blood or CSF on aspiration. Location was verified with both AP and either contralateral oblique or lateral fluoroscopic imaging.    A total of 1ml of Omnipaque 180 was injected demonstrating appropriate epidural spread and was checked in both the AP and either contralateral oblique or lateral  views to establish multiplanar confirmation. There was no evidence of intravascular or intrathecal spread. 9ml contrast wasted    1 ml of 1% preservative free lidocaine with 10mg of dexamethasone and 2ml of preservative free saline was injected.  The needle was removed from the epidural space, flushed with 1% lidocaine and removed.     Hemostasis was achieved, the area was cleaned, and bandaids were placed when appropriate.  The patient tolerated the procedure well, and was taken to the recovery room.    Images were saved to PACS.    Post-procedure pain score: 2/10  Follow-up includes:   -f/u with referring provider    Piotr Alfaro MD  Interventional Pain Medicine  [facility]

## 2024-05-23 NOTE — PATIENT INSTRUCTIONS
[facility]   Procedure Discharge Instructions    Today you saw:  Dr. Alfaro    You had an:  T6-7 Epidural steroid injection       Medications used:  Lidocaine Methylprednisolone Omnipaque Normal saline        If you were holding your blood thinning medication, please restart taking it: N/A  Be cautious when walking. Numbness and/or weakness in the lower extremities may occur for up to 6-8 hours after the procedure due to effect of the local anesthetic  Do not drive for 6 hours. The effect of the local anesthetic could slow your reflexes.   You may resume your regular activities after 24 hours  Avoid strenuous activity for the first 24 hours  You may shower, however avoid swimming, tub baths or hot tubs for 24 hours following your procedure  You may have a mild to moderate increase in pain for several days following the injection.  It may take up to 14 days for the steroid medication to start working although you may feel the effect as early as a few days after the procedure.     You may use ice packs for 10-15 minutes, 3 to 4 times a day at the injection site for comfort  Do not use heat to painful areas for 6 to 8 hours. This will give the local anesthetic time to wear off and prevent you from accidentally burning your skin.   Unless you have been directed to avoid the use of anti-inflammatory medications (NSAIDS), you may use medications such as ibuprofen, Aleve or Tylenol for pain control if needed.   If you were fasting, you may resume your normal diet and medications after the procedure  If you have diabetes, check your blood sugar more frequently than usual as your blood sugar may be higher than normal for 10-14 days following a steroid injection. Contact your doctor who manages your diabetes if your blood sugar is higher than usual  Possible side effects of steroids that you may experience include flushing, elevated blood pressure, increased appetite, mild headaches  and restlessness.  All of these symptoms will get better with time.  If you experience any of the following, call the Pain Clinic at [phone]:  -Fever over 100 degree F  -Swelling, bleeding, redness, drainage, warmth at the injection site  -Progressive weakness or numbness in your legs or arms  -Loss of bowel or bladder function  -Unusual headache that is not relieved by Tylenol or other pain reliever  -Unusual new onset of pain that is not improving

## 2024-05-23 NOTE — PROGRESS NOTES
Pre-procedure Intake  If YES to any questions or NO to having a   Please complete laminated checklist and leave on the computer keyboard for Provider, verbally inform provider if able.    Are you taking any any blood thinners such as Coumadin, Warfarin, Jantoven, Pradaxa Xarelto, Eliquis, Edoxaban, Enoxaparin, Lovenox, Heparin, Arixtra, Fondaparinux, or Fragmin? OR Antiplatelet medication such as Plavix, Brilinta, or Effient?   No   If yes, when did you take your last dose? na    Do you take aspirin?  No  If cervical procedure, have you held aspirin for 6 days?   NA    Do you have any allergies to contrast dye, iodine, steroid and/or numbing medications?  NO    Are you currently taking antibiotics or have an active infection?  NO    Have you had a fever/elevated temperature within the past week? NO    Are you currently taking oral steroids? NO    Have you had any vaccinations in the past seven days? NO    Do you have a ? Yes    Are you pregnant or breastfeeding?  Not Applicable    Have you received the COVID-19 vaccine? No no  If yes, was it your 1st, 2nd or only dose needed? na  Date of most recent vaccine: na    Notify provider and RNs if systolic BP >170, diastolic BP >100, P >100 or O2 sats < 90%

## 2024-05-28 ENCOUNTER — OFFICE VISIT (OUTPATIENT)
Dept: PALLIATIVE MEDICINE | Facility: CLINIC | Age: 54
End: 2024-05-28
Payer: COMMERCIAL

## 2024-05-28 VITALS — HEART RATE: 83 BPM | DIASTOLIC BLOOD PRESSURE: 100 MMHG | SYSTOLIC BLOOD PRESSURE: 146 MMHG

## 2024-05-28 DIAGNOSIS — M50.30 DISC-OSTEOPHYTE COMPLEX: ICD-10-CM

## 2024-05-28 DIAGNOSIS — Z51.81 ENCOUNTER FOR THERAPEUTIC DRUG MONITORING: ICD-10-CM

## 2024-05-28 DIAGNOSIS — M25.78 DISC-OSTEOPHYTE COMPLEX: ICD-10-CM

## 2024-05-28 DIAGNOSIS — M54.14 THORACIC RADICULOPATHY: Primary | ICD-10-CM

## 2024-05-28 DIAGNOSIS — M51.14 INTERVERTEBRAL DISC DISORDER WITH RADICULOPATHY OF THORACIC REGION: ICD-10-CM

## 2024-05-28 DIAGNOSIS — M54.9 INTRACTABLE BACK PAIN: ICD-10-CM

## 2024-05-28 PROCEDURE — 99214 OFFICE O/P EST MOD 30 MIN: CPT | Performed by: NURSE PRACTITIONER

## 2024-05-28 PROCEDURE — G2211 COMPLEX E/M VISIT ADD ON: HCPCS | Performed by: NURSE PRACTITIONER

## 2024-05-28 ASSESSMENT — PAIN SCALES - GENERAL: PAINLEVEL: MODERATE PAIN (5)

## 2024-05-28 NOTE — LETTER
Opioid / Opioid Plus Controlled Substance Agreement    This is an agreement between you and your provider about the safe and appropriate use of controlled substance/opioids prescribed by your care team. Controlled substances are medicines that can cause physical and mental dependence (abuse).    There are strict laws about having and using these medicines. We here at [facility] are committing to working with you in your efforts to get better. To support you in this work, we ll help you schedule regular office appointments for medicine refills. If we must cancel or change your appointment for any reason, we ll make sure you have enough medicine to last until your next appointment.     As a Provider, I will:  Listen carefully to your concerns and treat you with respect.   Recommend a treatment plan that I believe is in your best interest. This plan may involve therapies other than opioid pain medication.   Talk with you often about the possible benefits, and the risk of harm of any medicine that we prescribe for you.   Provide a plan on how to taper (discontinue or go off) using this medicine if the decision is made to stop its use.    As a Patient, I understand that opioid(s):   Are a controlled substance prescribed by my care team to help me function or work and manage my condition(s).   Are strong medicines and can cause serious side effects such as:  Drowsiness, which can seriously affect my driving ability  A lower breathing rate, enough to cause death  Harm to my thinking ability   Depression   Abuse of and addiction to this medicine  Need to be taken exactly as prescribed. Combining opioids with certain medicines or chemicals (such as illegal drugs, sedatives, sleeping pills, and benzodiazepines) can be dangerous or even fatal. If I stop opioids suddenly, I may have severe withdrawal symptoms.  Do not work for all types of pain nor for all patients. If they re not helpful, I may be asked to stop  them.    The risks, benefits and side effects of these medicine(s) were explained to me. I agree that:  I will take part in other treatments as advised by my care team. This may be psychiatry or counseling, physical therapy, behavioral therapy, group treatment or a referral to a specialist.     I will keep all my appointments. I understand that this is part of the monitoring of opioids. My care team may require an office visit for EVERY opioid/controlled substance refill. If I miss appointments or don t follow instructions, my care team may stop my medicine.    I will take my medicines as prescribed. I will not change the dose or schedule unless my care team tells me to. There will be no refills if I run out early.     I may be asked to come to the clinic and complete a urine drug test or complete a pill count at any time. If I don t give a urine sample or participate in a pill count, the care team may stop my medicine.    I will only receive prescriptions from this clinic for chronic pain. If I am treated by another provider for acute pain issues, I will tell them that I am taking opioid pain medication for chronic pain and that I have a treatment agreement with this provider. I will inform my [facility] care team within one business day if I am given a prescription for any pain medication by another healthcare provider. My [facility] care team can contact other providers and pharmacists about my use of any medicines.    It is up to me to make sure that I don t run out of my medicines on weekends or holidays. If my care team is willing to refill my opioid prescription without a visit, I must request refills only during office hours. Refills may take up to 3 business days to process. I will use one pharmacy to fill all my opioid and other controlled substance prescriptions. I will notify the clinic about any changes to my insurance or medication availability.    I am responsible for my prescriptions.  If the medicine/prescription is lost, stolen or destroyed, it will not be replaced. I also agree not to share controlled substance medicines with anyone.    I am aware I should not use any illegal or recreational drugs. I agree not to drink alcohol unless my care team says I can.       If I enroll in the [place] Medical Cannabis program, I will tell my care team prior to my next refill.     I will tell my care team right away if I become pregnant, have a new medical problem treated outside of my regular clinic, or have a change in my medications.    I understand that this medicine can affect my thinking, judgment and reaction time. Alcohol and drugs affect the brain and body, which can affect the safety of my driving. Being under the influence of alcohol or drugs can affect my decision-making, behaviors, personal safety, and the safety of others. Driving while impaired (DWI) can occur if a person is driving, operating, or in physical control of a car, motorcycle, boat, snowmobile, ATV, motorbike, off-road vehicle, or any other motor vehicle ([place] Statute 169A.20). I understand the risk if I choose to drive or operate any vehicle or machinery.    I understand that if I do not follow any of the conditions above, my prescriptions or treatment may be stopped or changed.          Opioids  What You Need to Know    What are opioids?   Opioids are pain medicines that must be prescribed by a doctor. They are also known as narcotics.     Examples are:   morphine (MS Contin, Julieth)  oxycodone (Oxycontin)  oxycodone and acetaminophen (Percocet)  hydrocodone and acetaminophen (Vicodin, Norco)   fentanyl patch (Duragesic)   hydromorphone (Dilaudid)   methadone  codeine (Tylenol #3)     What do opioids do well?   Opioids are best for severe short-term pain such as after a surgery or injury. They may work well for cancer pain. They may help some people with long-lasting (chronic) pain.     What do opioids NOT do well?   Opioids  never get rid of pain entirely, and they don t work well for most patients with chronic pain. Opioids don t reduce swelling, one of the causes of pain.                                    Other ways to manage chronic pain and improve function include:     Treat the health problem that may be causing pain  Anti-inflammation medicines, which reduce swelling and tenderness, such as ibuprofen (Advil, Motrin) or naproxen (Aleve)  Acetaminophen (Tylenol)  Antidepressants and anti-seizure medicines, especially for nerve pain  Topical treatments such as patches or creams  Injections or nerve blocks  Chiropractic or osteopathic treatment  Acupuncture, massage, deep breathing, meditation, visual imagery, aromatherapy  Use heat or ice at the pain site  Physical therapy   Exercise  Stop smoking  Take part in therapy       Risks and side effects     Talk to your doctor before you start or decide to keep taking opioids. Possible side effects include:    Lowering your breathing rate enough to cause death  Overdose, including death, especially if taking higher than prescribed doses  Worse depression symptoms; less pleasure in things you usually enjoy  Feeling tired or sluggish  Slower thoughts or cloudy thinking  Being more sensitive to pain over time; pain is harder to control  Trouble sleeping or restless sleep  Changes in hormone levels (for example, less testosterone)  Changes in sex drive or ability to have sex  Constipation  Unsafe driving  Itching and sweating  Dizziness  Nausea, throwing up and dry mouth    What else should I know about opioids?    Opioids may lead to dependence, tolerance, or addiction.    Dependence means that if you stop or reduce the medicine too quickly, you will have withdrawal symptoms. These include loose poop (diarrhea), jitters, flu-like symptoms, nervousness and tremors. Dependence is not the same as addiction.                     Tolerance means needing higher doses over time to get the same  effect. This may increase the chance of serious side effects.    Addiction is when people improperly use a substance that harms their body, their mind or their relations with others. Use of opiates can cause a relapse of addiction if you have a history of drug or alcohol abuse.    People who have used opioids for a long time may have a lower quality of life, worse depression, higher levels of pain and more visits to doctors.    You can overdose on opioids. Take these steps to lower your risk of overdose:    Recognize the signs:  Signs of overdose include decrease or loss of consciousness (blackout), slowed breathing, trouble waking up and blue lips. If someone is worried about overdose, they should call 911.    Talk to your doctor about Narcan (naloxone).   If you are at risk for overdose, you may be given a prescription for Narcan. This medicine very quickly reverses the effects of opioids.   If you overdose, a friend or family member can give you Narcan while waiting for the ambulance. They need to know the signs of overdose and how to give Narcan.     Don't use alcohol or street drugs.   Taking them with opioids can cause death.    Do not take any of these medicines unless your doctor says it s OK. Taking these with opioids can cause death:  Benzodiazepines, such as lorazepam (Ativan), alprazolam (Xanax) or diazepam (Valium)  Muscle relaxers, such as cyclobenzaprine (Flexeril)  Sleeping pills like zolpidem (Ambien)   Other opioids      How to keep you and other people safe while taking opioids:    Never share your opioids with others.  Opioid medicines are regulated by the Drug Enforcement Agency (DOMINIC). Selling or sharing medications is a criminal act.    2. Be sure to store opioids in a secure place, locked up if possible. Young children can easily swallow them and overdose.    3. When you are traveling with your medicines, keep them in the original bottles. If you use a pill box, be sure you also carry a copy  of your medicine list from your clinic or pharmacy.    4. Safe disposal of opioids    Most pharmacies have places to get rid of medicine, called disposal kiosks. Medicine disposal options are also available in every [facility]. Search your county and  medication disposal  to find more options. You can find more details at:  [email or web address]     I agree that my provider, clinic care team, and pharmacy may work with any city, state or federal law enforcement agency that investigates the misuse, sale, or other diversion of my controlled medicine. I will allow my provider to discuss my care with, or share a copy of, this agreement with any other treating provider, pharmacy or emergency room where I receive care.    I have read this agreement and have asked questions about anything I did not understand.    _______________________________________________________  Patient Signature - Paul Kay _____________________                   Date     _______________________________________________________  Provider Signature - MERARI JORDAN, DNP   _____________________                   Date     _______________________________________________________  Witness Signature (required if provider not present while patient signing)   _____________________                   Date

## 2024-05-28 NOTE — PATIENT INSTRUCTIONS
PATIENT INSTRUCTIONS:     I am recommending a multidisciplinary treatment plan to help this patient better manage his pain. The following recommendations were given to the patient. Diagnosis, treatment options, risks, benefits, and alternatives were discussed; all questions were answered. Self-care instructions were given. The patient expressed understanding of the plan for management.   Medication Management:   - CONTINUE taking Gabapentin 400mg three times per day   - CONTINUE taking Cyclobenzaprine 5mg - take 1 tab (5mg) every 8 hours as needed for spasms; okay to take 2 tabs (10mg) at bedtime   - CONTINUE taking Tramadol 50mg - take 1/2 - 1 tab (25mg-50mg) every 6 hours as needed for severe pain; max 3 per day     Return to Clinic:   -  30-minute In-Person Appointment with Eva Fong DNP, VALENTIN, MIL in 2 weeks, or sooner if needed    Future Considerations:   - Please establish with a Primary Care Provider for general health and wellness.    ----------------------------------------------------------------  Clinic Number:  [phone]   Call with any questions about your care and for scheduling assistance.   Calls are returned Monday through Friday between 8 AM and 4:30 PM. We usually get back to you within 2 business days depending on the issue/request.    If we are prescribing your medications:  For opioid medication refills, call the clinic or send a MyFit message 7 days in advance.  Please include:  Name of requested medication  Name of the pharmacy.  For non-opioid medications, call your pharmacy directly to request a refill. Please allow 3-4 days to be processed.   Per [place] State Law:  All controlled substance prescriptions must be filled within 30 days of being written.    For those controlled substances allowing refills, pickup must occur within 30 days of last fill.      We believe regular attendance is key to your success in our program!    Any time you are unable to keep your appointment we ask  that you call us at least 24 hours in advance to cancel.This will allow us to offer the appointment time to another patient.   Multiple missed appointments may lead to dismissal from the clinic.

## 2024-05-28 NOTE — PROGRESS NOTES
"[facility]         Date of Visit: 5/28/2024    CHIEF COMPLAINT:   Chief Complaint   Patient presents with    Pain       Subjective   SUBJECTIVE    INTERVAL HISTORY:   Paul Kay is a 53 year old male seen for MED SPINE INITIAL EVALUATION on 4/22/24; last seen for FOLLOW UP on 5/13/24.  They are a patient of mine and returning today for thoracic radiculopathy.       Since the last visit, Paul Kay reports:   Pain score today: Moderate Pain (5)   Pain rating: intensity ranges from 4/10 to 8/10, and averages 5/10 on a 0-10 scale.   Paul underwent T6-T7 Interlaminar TESI on 5/23/24 and experienced minimal benefit with no significant improvement in the days afterward.  Today, he reports the past 2-3 days have been difficult with pain levels. He is experiencing muscle spasms over lateral right ribs of lower thoracic spine. He is taking gabapentin, cyclobenzaprine and tramadol at bedtime; additionally gabapentin in AM and mid day. Gabapentin 400mg TID; using Tramadol 50mg only at bedtime.   States \"I was feeling pretty good for awhile\" with significant improvement in sleep; has missed cyclobenzaprine at bedtime for past 2-3 nights. Increased stiffness in AM, then able to \"push through\" the workday; pain is significantly worse by the time he gets home after work.       Recommendations/ Plan at the last visit included:  Paul Kay is a pleasant 53 year old male who presents with thoracic radiculopathy and intractable back pain.  He is scheduled for thoracic ANASTACIA next week but states pain has been excruciating.  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  In the meantime he states he is desperate for any pain relief.  I offered a 14-day trial of tramadol for severe breakthrough pain.  Also adding cyclobenzaprine 5 to 10 mg at bedtime to ease pain and promote sleep.  Increase gabapentin to 400 mg 3 times daily.  Reinforced that " epidural steroid injection next week should be very beneficial.  In the meantime, I am willing to bring the patient into the chronic pain program to address longer term pain needs.  Ordering urine drug screen today.  Return to clinic in 2 weeks to reassess pain level, response to thoracic ANASTACIA and establish controlled substance agreement for ongoing management.  I discussed the benefits of buprenorphine for chronic pain management and will consider a trial after prior authorization.  Patient verbalizes understanding agreement with plan.  PLAN:    Medication Management:   - START taking Cyclobenzaprine 5mg - take 1-2 tabs (5-10mg) every 6-8 hours as needed; do not exceed 3 per day    - START taking Tramadol 50mg - take 1-1.5 tabs every 6-8 hours for severe pain; do not exceed 3 per day   - INCREASE dose of Gabapentin to 400mg by mouth three times per day    Continue Current Treatment Plan with:   - Physical Therapy    - Thoracic epidural steroid injection next week.   New Referrals:   - Chiropractor:    I highly recommend Chiropractic care for conservative treatment your condition. Please contact your health insurance customer service line to find a local Chiropractor that participates in your health plan.    New Orders:   - Urine drug screen to confirm status of control substance use.     Return to Clinic:   -  30-minute In-Person Appointment with Eva Fong, TYLER, VALENTIN, MIL in 2-3 weeks, or sooner if needed            Interval history from last visit on 5/13/24:   Patient was last seen in clinic 4/22/24. Since last time he felt much better after using Medrol dose pack; but severe pain returned after 2-3 days. Physical activity over the past weekend was higher than normal. He has tolerated Gabapentin taper up to 300mg TID.  States pain is so severe that he is nearly immobile by the time he is on for work and this is impacting his relationships at home.  He is questioning the cause of the spinal cord lesion and  does not understand the full impact of his thoracic MRI results.  He is trying to avoid surgery as long as possible.  Struggling to get to physical therapy appointments due to work schedule; does have some exercises he has been working on at home.          REVIEW OF SYSTEMS:   ROS: 10 point ROS neg other than the symptoms noted above in the HPI.     The patient otherwise denies red flag symptoms, such as: thunderclap headache, bowel or bladder incontinence, parasthesias, weakness, saddle anesthesia, unintentional weight loss, or fever/chills/sweats.     Medical History: Any changes in medical history since they were last seen? No    Medications and Allergies reviewed. Yes     Review of [place] Prescription Monitoring Program ():  reviewed on 5/28/24. No concern for abuse or misuse of controlled medications based on this report. Controlled substances prescribed in the past 12 months include: (gabapentin, tramadol)     Current MME: 30 / day    Current controlled substance medications, if any, are being prescribed by: Pain Mgmt    Primary Care Provider is No Ref-Primary, Physician       CSA due date: 5/28/25   UDS due date:  5/13/25     Initiating CSA today        Objective    OBJECTIVE:    Physical Exam  Vitals:    05/28/24 1519   BP: (!) 146/100   Pulse: 83        Appearance:   A&O. Patient is appropriate.   Patient is in NAD.      HHENT:  Normocephalic, atraumatic. Eyes without conjunctival injection or jaundice. Neck supple. No obvious neck masses.     Pulmonary:  Normal effort; no cough or audible wheeze      Skin: No obvious rash, lesions, or petechiae of exposed skin.    Neuro: Cranial nerves grossly intact.  Mentation and speech appropriate for age.     Psych:  Alert, without lethargy or stupor. Speech fluent. Appropriate affect. Mood normal. Able to follow commands without difficulty. Normal mood, judgement and behavior.    Suicidal or homicidal ideation: NO      Gait pattern:   Patient has an antalgic  gait pattern:YES  Gait favors the neither side.  Mobility and/or assistive devices? NO            Diagnostic Tests/ Imaging/ Labs resulted since last visit:   5/13/24 - UDS consistent with known prescribed medications       Assessment & Plan    ASSESSMENT:     Visit discussion: Paul Kay is seen in follow up today at the pain clinic for Chronic thoracic radiculopathy.  2-week trial of tramadol indicates better pain control at bedtime.  Patient is unsure of pain response during the day because he has been avoiding any medications other than gabapentin during the day.  We discussed the benefits of dosing tramadol versus cyclobenzaprine more frequently to achieve better pain control later in the day.  At this time he is consistent with gabapentin 400 mg 3 times daily.  Okay to trial tramadol 50 mg earlier in the day; may take half to 1 full tab.  Also okay to trial cyclobenzaprine earlier in the day.  Recommending patient try these on a weekend to determine whether the increased sedation and fatigue.  If he tolerates without side effects then okay to take tramadol 50 mg up to 3/day.  Cyclobenzaprine 5 mg up to 4/day.  Continue gabapentin as prescribed.  Patient encouraged to establish with a primary care provider for general health and wellness.  May consider CMP and CBC at next encounter pending status of muscle spasms.  Return to clinic in 2 weeks to reassess pain control.  Patient verbalizes understanding and agreement with plan.         PLAN:    Medication Management:   - CONTINUE taking Gabapentin 400mg three times per day   - CONTINUE taking Cyclobenzaprine 5mg - take 1 tab (5mg) every 8 hours as needed for spasms; okay to take 2 tabs (10mg) at bedtime   - CONTINUE taking Tramadol 50mg - take 1/2 - 1 tab (25mg-50mg) every 6 hours as needed for severe pain; max 3 per day       Return to Clinic:   -  30-minute In-Person Appointment with Eva Fong, TYLER, APRN, FNP-C in 2 weeks, or sooner if needed       Future Considerations:   - Please establish with a Primary Care Provider for general health and wellness.        VISIT DIAGNOSIS:   1. Thoracic radiculopathy  - Adult Pain Clinic Follow-Up Order  - Adult Pain Clinic Follow-Up Order; Future    2. Intractable back pain  - Adult Pain Clinic Follow-Up Order  - Adult Pain Clinic Follow-Up Order; Future    3. 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 Disc-osteophyte complex  - Adult Pain Clinic Follow-Up Order  - Adult Pain Clinic Follow-Up Order; Future    5. Encounter for therapeutic drug monitoring  - Adult Pain Clinic Follow-Up Order  - Adult Pain Clinic Follow-Up Order; Future      ___________________________________________________________________    BILLING TIME DOCUMENTATION:   TOTAL TIME includes:   Time spent preparing to see the patient: 3 minutes (reviewing records and tests)  Time spend face to face with the patient: 29 minutes  Time spent ordering tests, medications, procedures and referrals: 0 minutes  Time spent Referring and communicating with other healthcare professionals: 0 minutes  Documenting clinical information in Epic: 3 minutes    The total TIME spent on this patient on the day of the appointment was 35 minutes.     ___________________________________________________________________    Review of Electronic Chart: Today I have also reviewed available medical information in the patient's medical record at [facility] and Care Everywhere (if available), including relevant provider notes, laboratory work, and imaging.     Eva Fong, VALENTIN, DNP, FNP-C   [facility]

## 2024-06-20 ENCOUNTER — MYC REFILL (OUTPATIENT)
Dept: PALLIATIVE MEDICINE | Facility: CLINIC | Age: 54
End: 2024-06-20
Payer: COMMERCIAL

## 2024-06-20 DIAGNOSIS — M25.78 DISC-OSTEOPHYTE COMPLEX: ICD-10-CM

## 2024-06-20 DIAGNOSIS — M54.9 INTRACTABLE BACK PAIN: ICD-10-CM

## 2024-06-20 DIAGNOSIS — M54.14 THORACIC RADICULOPATHY: ICD-10-CM

## 2024-06-20 DIAGNOSIS — M50.30 DISC-OSTEOPHYTE COMPLEX: ICD-10-CM

## 2024-06-20 DIAGNOSIS — M51.14 INTERVERTEBRAL DISC DISORDER WITH RADICULOPATHY OF THORACIC REGION: ICD-10-CM

## 2024-06-20 RX ORDER — TRAMADOL HYDROCHLORIDE 50 MG/1
50 TABLET ORAL EVERY 6 HOURS PRN
Qty: 90 TABLET | Refills: 0 | Status: SHIPPED | OUTPATIENT
Start: 2024-06-20

## 2024-06-20 RX ORDER — CYCLOBENZAPRINE HCL 5 MG
5-10 TABLET ORAL EVERY 8 HOURS PRN
Qty: 30 TABLET | Refills: 0 | Status: CANCELLED | OUTPATIENT
Start: 2024-06-20

## 2024-06-20 RX ORDER — GABAPENTIN 100 MG/1
CAPSULE ORAL
Qty: 270 CAPSULE | Refills: 0 | Status: CANCELLED | OUTPATIENT
Start: 2024-06-20

## 2024-06-20 NOTE — TELEPHONE ENCOUNTER
Refills have been requested for the following medications:         traMADol (ULTRAM) 50 MG tablet [MERARI JORDAN]     Preferred pharmacy: [facility] - [place] [address]

## 2024-06-20 NOTE — TELEPHONE ENCOUNTER
Refills have been requested for the following medications:         cyclobenzaprine (FLEXERIL) 5 MG tablet [MERARI JORDAN]     Preferred pharmacy: [facility] - [place] [id number]

## 2024-06-20 NOTE — TELEPHONE ENCOUNTER
Refills have been requested for the following medications:         gabapentin (NEURONTIN) 100 MG capsule [MERARI JORDAN]      Patient Comment: Need 400mg capsules      Preferred pharmacy: [facility] - [place] [address]

## 2024-06-20 NOTE — TELEPHONE ENCOUNTER
Medication refill information reviewed.     Due date for traMADol (ULTRAM) 50 MG tablet  is anytime per last 14 day supply written on 5/13/2024.      Prescriptions prepped for review.     Will route to provider.     Marquita Torres RN

## 2024-06-20 NOTE — TELEPHONE ENCOUNTER
6/20/2024 3:23 PM     REFILL REQUEST:     This is a patient of mine. PDMP and Chart have been reviewed; refill request is appropriate.     Refilled for 30 day supply.  Script e-Prescribed to pharmacy    VALENTIN Ventura, TYLER, FNP-C

## 2024-06-20 NOTE — TELEPHONE ENCOUNTER
Received request for a refill(s) of     traMADol (ULTRAM) 50 MG tablet    Last dispensed from pharmacy on 06/07/24  cyclobenzaprine (FLEXERIL) 5 MG tablet   Last dispensed from pharmacy on 05/13/24  gabapentin (NEURONTIN) 100 MG capsule   Last dispensed from pharmacy on 04/22/24    Patient's last office/virtual visit by prescribing provider on 05/28/24  Next office/virtual appointment scheduled for None    Last urine drug screen date 05/13/24  Current opioid agreement on file (completed within the last year) Yes Date of opioid agreement: 05/28/24    E-prescribe to     [facility] [address] [place] [address] [address] [address]  Phone: [phone] Fax: [phone]    Will route to nursing [place] for review and preparation of prescription(s).       Grace Judge MA  [facility] [place]

## 2024-06-24 ENCOUNTER — TELEPHONE (OUTPATIENT)
Dept: PALLIATIVE MEDICINE | Facility: CLINIC | Age: 54
End: 2024-06-24
Payer: COMMERCIAL

## 2024-06-24 NOTE — TELEPHONE ENCOUNTER
Prior Authorization Retail Medication Request    Medication/Dose: traMADol (ULTRAM) 50 MG tablet  Diagnosis and ICD code (if different than what is on RX):    Thoracic radiculopathy [M54.14]      Intractable back pain [M54.9]      Intervertebral disc disorder with radiculopathy of thoracic region [M51.14]        New/renewal/insurance change PA/secondary ins. PA:  Previously Tried and Failed:  No  Rationale: NA    Insurance [employer] ([employer])   Primary: [id number]  Insurance ID: [id number]    Secondary (if applicable):na  I    Covered: Retail, Mail OrderUnknown: Specialty, Long-Term Care                Member ID: [id number] BIN: [id number]  : 1970   Group ID: [id number] PCN: [id number]  Legal sex: M   Group name:   Address: [address] [place] [address]   Use As Primary Coverage  nsurance ID:  yes    Pharmacy Information (if different than what is on RX)  Name: [facility] #[id number]  [place] [address]  Phone:    Fax [phone]    Haylee Nunn MA  [facility] [place]

## 2025-04-19 ENCOUNTER — HEALTH MAINTENANCE LETTER (OUTPATIENT)
Age: 55
End: 2025-04-19